# Patient Record
Sex: MALE | Race: WHITE | NOT HISPANIC OR LATINO | URBAN - METROPOLITAN AREA
[De-identification: names, ages, dates, MRNs, and addresses within clinical notes are randomized per-mention and may not be internally consistent; named-entity substitution may affect disease eponyms.]

---

## 2024-06-08 ENCOUNTER — EMERGENCY (EMERGENCY)
Facility: HOSPITAL | Age: 49
LOS: 1 days | Discharge: HOME | End: 2024-06-08
Attending: EMERGENCY MEDICINE | Admitting: EMERGENCY MEDICINE
Payer: COMMERCIAL

## 2024-06-08 VITALS
TEMPERATURE: 98 F | HEIGHT: 69.69 IN | RESPIRATION RATE: 18 BRPM | OXYGEN SATURATION: 96 % | WEIGHT: 165.35 LBS | HEART RATE: 77 BPM | DIASTOLIC BLOOD PRESSURE: 84 MMHG | SYSTOLIC BLOOD PRESSURE: 148 MMHG

## 2024-06-08 VITALS
HEART RATE: 72 BPM | SYSTOLIC BLOOD PRESSURE: 117 MMHG | OXYGEN SATURATION: 99 % | RESPIRATION RATE: 18 BRPM | DIASTOLIC BLOOD PRESSURE: 78 MMHG | TEMPERATURE: 98 F

## 2024-06-08 PROCEDURE — 99053 MED SERV 10PM-8AM 24 HR FAC: CPT

## 2024-06-08 PROCEDURE — 99283 EMERGENCY DEPT VISIT LOW MDM: CPT

## 2024-06-08 PROCEDURE — 99284 EMERGENCY DEPT VISIT MOD MDM: CPT

## 2024-06-08 RX ORDER — IBUPROFEN 200 MG
400 TABLET ORAL ONCE
Refills: 0 | Status: COMPLETED | OUTPATIENT
Start: 2024-06-08 | End: 2024-06-08

## 2024-06-08 RX ORDER — IBUPROFEN 200 MG
1 TABLET ORAL
Qty: 16 | Refills: 0
Start: 2024-06-08

## 2024-06-08 RX ORDER — CIPROFLOXACIN HCL 0.3 %
1 DROPS OPHTHALMIC (EYE) ONCE
Refills: 0 | Status: COMPLETED | OUTPATIENT
Start: 2024-06-08 | End: 2024-06-08

## 2024-06-08 RX ORDER — MORPHINE SULFATE 50 MG/1
4 CAPSULE, EXTENDED RELEASE ORAL ONCE
Refills: 0 | Status: DISCONTINUED | OUTPATIENT
Start: 2024-06-08 | End: 2024-06-08

## 2024-06-08 RX ORDER — OFLOXACIN 0.3 %
1 DROPS OPHTHALMIC (EYE)
Qty: 1 | Refills: 0
Start: 2024-06-08

## 2024-06-08 RX ORDER — KETOROLAC TROMETHAMINE 30 MG/ML
15 SYRINGE (ML) INJECTION ONCE
Refills: 0 | Status: DISCONTINUED | OUTPATIENT
Start: 2024-06-08 | End: 2024-06-08

## 2024-06-08 RX ORDER — ACETAMINOPHEN 500 MG
1000 TABLET ORAL ONCE
Refills: 0 | Status: DISCONTINUED | OUTPATIENT
Start: 2024-06-08 | End: 2024-06-08

## 2024-06-08 RX ORDER — CYCLOPENTOLATE HYDROCHLORIDE 10 MG/ML
1 SOLUTION/ DROPS OPHTHALMIC
Qty: 1 | Refills: 0
Start: 2024-06-08

## 2024-06-08 RX ADMIN — Medication 1 DROP(S): at 06:15

## 2024-06-08 RX ADMIN — Medication 400 MILLIGRAM(S): at 06:56

## 2024-06-08 RX ADMIN — Medication 1 DROP(S): at 06:56

## 2024-06-08 NOTE — ED ADULT NURSE NOTE - OBJECTIVE STATEMENT
pt reports feeling b/l eye irritation since yesterday but has signicantly gotten worse since then. pt is unable to open eyes dues to pain, no drainage noted

## 2024-06-08 NOTE — ED PROVIDER NOTE - NSFOLLOWUPINSTRUCTIONS_ED_ALL_ED_FT
Use the antibiotic drops 1 drop in the left eye every 1-2 hours.  Use erythromycin ophthalmic ointment at bedtime.  Follow up at [facility] on Monday.  Return to the Emergency Department if you have any new or worsening symptoms, or if you have any concerns.  ===============  Corneal Abrasion  An eye showing the cornea and eyelid.   A corneal abrasion is a scratch or injury to the clear tissue at the front of the eye (cornea). It can be very painful. The cornea protects the eye and helps the eye focus. The cornea is made up of many layers, but the surface layer is one of the most sensitive tissues in the body.    A corneal abrasion heals fast when it is treated. If it is not treated, it can become infected and cause an open sore (ulcer). This can lead to scarring, and the scarring can affect vision. Sometimes after the abrasion heals, another abrasion can come back in the same place.    What are the causes?  A corneal abrasion may be caused by:  A poke to the eye.  A gritty or irritating substance (foreign body) in the eye.  Too much eye rubbing.  Very dry eyes.  Certain eye infections.  Contact lenses that do not fit right or are worn for too long. Injury can also happen when putting in contact lenses or taking them out.  Eye surgery.  Certain cornea problems that make it more likely to get a corneal abrasion.  Sometimes, the cause is not known.    What are the signs or symptoms?  Symptoms of this condition include:  Eye pain. The pain may get worse when you open, close, or move your eye.  A feeling that something is poking your eye or is stuck in your eye.  Tearing, redness, and sensitivity to light.  Having trouble keeping your eye open, or not being able to keep it open.  Blurred vision.  Headache.  How is this diagnosed?  A corneal abrasion may be diagnosed based on your medical history, symptoms, and an eye exam. You may need to see a doctor who specializes in conditions of the eye (ophthalmologist or optometrist).    Before the eye exam, you may be given eye drops that numb the eye. You may also have dye put in your eye with a dropper or a small paper strip. This dye helps your eye doctor see your injury better when using a light or an eye scope (slit lamp).    How is this treated?  Treatment may vary based on what caused the corneal abrasion. It may include:  Washing out your eye.  Taking out anything that is stuck in your eye.  Using antibiotic drops or ointment to treat or prevent an infection.  Using eye drops that lessens inflammation and pain.  Using steroid drops or ointment to treat redness, irritation, or inflammation.  Applying a cold, wet cloth (cold compress) or ice pack to lessen the pain.  Taking pain medicines. This may include over-the-counter medicines like ibuprofen. If you have a large or deep corneal abrasion, an opioid medicine may be prescribed.  For large abrasions, an eye patch or a bandage soft contact lens might also be used. A bandage soft contact lens protects the cornea while it is healing. These are not used if the corneal abrasion is related to wearing contact lenses. This is because you may be more likely to get an eye infection.    Follow these instructions at home:  Medicines    If you were prescribed eye drops or ointment, use them as told by your provider. You may be told to use:  Antibiotic eye drops or ointment. Do not stop using them even if you start to feel better.  Eye drops that moisten the eye (lubricating eye drops).  Ask your provider if the medicine prescribed to you:  Requires you to avoid driving or using machinery.  Can cause constipation. You may need to take these actions to prevent or treat constipation:  Drink enough fluid to keep your pee (urine) pale yellow.  Take over-the-counter or prescription medicines.  Eat foods that are high in fiber, such as beans, whole grains, and fresh fruits and vegetables.  Limit foods that are high in fat and processed sugars, such as fried or sweet foods.  Take over-the-counter and prescription medicines only as told by your provider.  Eye care    Rest your eyes. Avoid bright light and overusing (straining) your eyes. Wear sunglasses.  Do not rub or touch your eye. Do not wash out your eye.  Ask your provider if you can use a cold compress on your eye to lessen pain.  If you have an eye patch:  Wear it as told by your provider.  Follow your provider's instructions about when to take it off.  If you have a bandage soft contact lens, your provider will take it off during your follow-up visit.  Do not wear your own contact lenses until your provider says it is okay.  General instructions    Do not drive or use machinery as told by your provider. You may not be able to  distances as well if your vision is affected or if you are wearing an eye patch.  Keep all follow-up visits. This helps to prevent infection and vision loss.  Contact a health care provider if:  You keep having eye pain and other symptoms for more than 2–3 days.  You have new symptoms, such as more redness, tearing, or fluid (discharge) coming from your eye.  You have swollen eyelids.  Your vision gets much worse.  You have discharge that makes your eyelids stick together in the morning.  Your eye patch becomes so loose that you can blink your eye.  Symptoms come back after your abrasion heals.  Get help right away if:  You have severe eye pain that does not get better with medicine.  You have severe vision loss.  This information is not intended to replace advice given to you by your health care provider. Make sure you discuss any questions you have with your health care provider. Use the antibiotic drops 1 drop in the left eye every 2 hours (ofloxacin).  Take ibuprofen 400 mg every 6 hours if needed for pain (take with food).  Use cyclopentolate 1 drop twice each day if needed for severe pain.  Use erythromycin ophthalmic ointment at bedtime.    Follow up at [facility] on Monday.  Call the cornea clinic: [phone]    Return to the Emergency Department if you have any new or worsening symptoms, or if you have any concerns.  ===============  Corneal Abrasion  An eye showing the cornea and eyelid.   A corneal abrasion is a scratch or injury to the clear tissue at the front of the eye (cornea). It can be very painful. The cornea protects the eye and helps the eye focus. The cornea is made up of many layers, but the surface layer is one of the most sensitive tissues in the body.    A corneal abrasion heals fast when it is treated. If it is not treated, it can become infected and cause an open sore (ulcer). This can lead to scarring, and the scarring can affect vision. Sometimes after the abrasion heals, another abrasion can come back in the same place.    What are the causes?  A corneal abrasion may be caused by:  A poke to the eye.  A gritty or irritating substance (foreign body) in the eye.  Too much eye rubbing.  Very dry eyes.  Certain eye infections.  Contact lenses that do not fit right or are worn for too long. Injury can also happen when putting in contact lenses or taking them out.  Eye surgery.  Certain cornea problems that make it more likely to get a corneal abrasion.  Sometimes, the cause is not known.    What are the signs or symptoms?  Symptoms of this condition include:  Eye pain. The pain may get worse when you open, close, or move your eye.  A feeling that something is poking your eye or is stuck in your eye.  Tearing, redness, and sensitivity to light.  Having trouble keeping your eye open, or not being able to keep it open.  Blurred vision.  Headache.  How is this diagnosed?  A corneal abrasion may be diagnosed based on your medical history, symptoms, and an eye exam. You may need to see a doctor who specializes in conditions of the eye (ophthalmologist or optometrist).    Before the eye exam, you may be given eye drops that numb the eye. You may also have dye put in your eye with a dropper or a small paper strip. This dye helps your eye doctor see your injury better when using a light or an eye scope (slit lamp).    How is this treated?  Treatment may vary based on what caused the corneal abrasion. It may include:  Washing out your eye.  Taking out anything that is stuck in your eye.  Using antibiotic drops or ointment to treat or prevent an infection.  Using eye drops that lessens inflammation and pain.  Using steroid drops or ointment to treat redness, irritation, or inflammation.  Applying a cold, wet cloth (cold compress) or ice pack to lessen the pain.  Taking pain medicines. This may include over-the-counter medicines like ibuprofen. If you have a large or deep corneal abrasion, an opioid medicine may be prescribed.  For large abrasions, an eye patch or a bandage soft contact lens might also be used. A bandage soft contact lens protects the cornea while it is healing. These are not used if the corneal abrasion is related to wearing contact lenses. This is because you may be more likely to get an eye infection.    Follow these instructions at home:  Medicines    If you were prescribed eye drops or ointment, use them as told by your provider. You may be told to use:  Antibiotic eye drops or ointment. Do not stop using them even if you start to feel better.  Eye drops that moisten the eye (lubricating eye drops).  Ask your provider if the medicine prescribed to you:  Requires you to avoid driving or using machinery.  Can cause constipation. You may need to take these actions to prevent or treat constipation:  Drink enough fluid to keep your pee (urine) pale yellow.  Take over-the-counter or prescription medicines.  Eat foods that are high in fiber, such as beans, whole grains, and fresh fruits and vegetables.  Limit foods that are high in fat and processed sugars, such as fried or sweet foods.  Take over-the-counter and prescription medicines only as told by your provider.  Eye care    Rest your eyes. Avoid bright light and overusing (straining) your eyes. Wear sunglasses.  Do not rub or touch your eye. Do not wash out your eye.  Ask your provider if you can use a cold compress on your eye to lessen pain.  If you have an eye patch:  Wear it as told by your provider.  Follow your provider's instructions about when to take it off.  If you have a bandage soft contact lens, your provider will take it off during your follow-up visit.  Do not wear your own contact lenses until your provider says it is okay.  General instructions    Do not drive or use machinery as told by your provider. You may not be able to  distances as well if your vision is affected or if you are wearing an eye patch.  Keep all follow-up visits. This helps to prevent infection and vision loss.  Contact a health care provider if:  You keep having eye pain and other symptoms for more than 2–3 days.  You have new symptoms, such as more redness, tearing, or fluid (discharge) coming from your eye.  You have swollen eyelids.  Your vision gets much worse.  You have discharge that makes your eyelids stick together in the morning.  Your eye patch becomes so loose that you can blink your eye.  Symptoms come back after your abrasion heals.  Get help right away if:  You have severe eye pain that does not get better with medicine.  You have severe vision loss.  This information is not intended to replace advice given to you by your health care provider. Make sure you discuss any questions you have with your health care provider.

## 2024-06-08 NOTE — ED PROVIDER NOTE - PATIENT PORTAL LINK FT
You can access the FollowMyHealth Patient Portal offered by [facility] by registering at the following website: [email or web address]. By joining [employer]’s FollowMyHealth portal, you will also be able to view your health information using other applications (apps) compatible with our system.

## 2024-06-08 NOTE — ED PROVIDER NOTE - PHYSICAL EXAMINATION
GENERAL: appears uncomfortable, covering eyes  HEAD: atraumatic  EYE: Corrected VA: 20/20 in each eye  Lids normal. Conjunctiva diffusely injected.  PERRL. EOMI. + consensual photophobia.  SLE: fluorescein uptake at 12 oclock border of left pupil. no fluorescein streaming.  IOP: 9 left, 11 right.  ENT:  no rhinorrhea  CV:  PULM:  GI:  :  SKIN: normal color and turgor.  no periorbital/facial rash  MSK:  NEURO: alert, clear mental status.  CN grossly intact.  MAEW.  gait steady. GENERAL: appears uncomfortable, covering eyes  HEAD: atraumatic  EYE: Corrected VA: 20/20 in each eye  Lids normal. Conjunctiva diffusely injected.  PERRL. EOMI. + consensual photophobia. No corneal infiltrates seen. No FB seen under lid. SLE: fluorescein uptake at 12 oclock border of left pupil. no fluorescein streaming.  IOP: 9 left, 11 right.  ENT:  no rhinorrhea  CV:  PULM:  GI:  :  SKIN: normal color and turgor.  no periorbital/facial rash  MSK:  NEURO: alert, clear mental status.  CN grossly intact.  MAEW.  gait steady.

## 2024-06-08 NOTE — ED PROVIDER NOTE - PROGRESS NOTE DETAILS
POST-DISCHARGE NOTE: Attempted to contact patient to review post-discharge care.  No phone number available.  I called patient's hotel (which he had confirmed this with me this morning, [facility], [place]) -  there states there is no guest registered under the patient's name.

## 2024-06-08 NOTE — ED ADULT TRIAGE NOTE - CHIEF COMPLAINT QUOTE
Patient presents to the ED with b/l eye pain x yesterday. Pt denies pmh, states that since yesterday his eyes have been hurting, denies trauma. Pt changed his contacts yesterday, went to sleep and reports that burning in both eyes have gotten worse. Endorsing photophobia and headache.

## 2024-06-08 NOTE — ED PROVIDER NOTE - CLINICAL SUMMARY MEDICAL DECISION MAKING FREE TEXT BOX
Sudden onset left eye pain last night while walking in park; pain worse overnight and unable to open eye without pain.  Pain markedly improved with tetracaine, allowing eye exam.  Good visual acuity, SLE showing corneal abr at 12 oclock.  AC clear.  IOP normal, doubt acute glaucoma.  Will treat with topical abx gtts and ointment.  Follow up at [facility] ophthalmology. Sudden onset left eye pain last night while walking in park; pain worse overnight and unable to open eye without pain.  Pain markedly improved with tetracaine, allowing eye exam.  Good visual acuity, SLE showing corneal abr at 12 oclock.  AC clear.  IOP normal, doubt acute glaucoma. Consensual photophobia noted. Suspect corneal abrasion causing iritis. Will treat with topical abx gtts and ointment, cycloplegic and NSAID for pain. Discussed with ophthalmology fellow.  Follow up at [facility] ophthal[facility].

## 2024-06-08 NOTE — ED ADULT NURSE NOTE - CHPI ED NUR SYMPTOMS NEG
no discharge/no double vision/no drainage/no eye lid swelling/no foreign body/no itching/no purulent drainage
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
## 2024-06-10 DIAGNOSIS — H57.12 OCULAR PAIN, LEFT EYE: ICD-10-CM

## 2024-06-10 DIAGNOSIS — S05.02XA INJURY OF CONJUNCTIVA AND CORNEAL ABRASION WITHOUT FOREIGN BODY, LEFT EYE, INITIAL ENCOUNTER: ICD-10-CM
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
